# Patient Record
Sex: FEMALE | URBAN - METROPOLITAN AREA
[De-identification: names, ages, dates, MRNs, and addresses within clinical notes are randomized per-mention and may not be internally consistent; named-entity substitution may affect disease eponyms.]

---

## 2024-11-21 ENCOUNTER — APPOINTMENT (RX ONLY)
Dept: URBAN - METROPOLITAN AREA CLINIC 74 | Facility: CLINIC | Age: 24
Setting detail: DERMATOLOGY
End: 2024-11-21

## 2024-11-21 DIAGNOSIS — L259 CONTACT DERMATITIS AND OTHER ECZEMA, UNSPECIFIED CAUSE: ICD-10-CM

## 2024-11-21 PROBLEM — L30.9 DERMATITIS, UNSPECIFIED: Status: ACTIVE | Noted: 2024-11-21

## 2024-11-21 PROCEDURE — 99203 OFFICE O/P NEW LOW 30 MIN: CPT

## 2024-11-21 PROCEDURE — ? COUNSELING

## 2024-11-21 PROCEDURE — ? PRESCRIPTION

## 2024-11-21 PROCEDURE — ? TREATMENT REGIMEN

## 2024-11-21 PROCEDURE — ? PATIENT SPECIFIC COUNSELING

## 2024-11-21 RX ORDER — TACROLIMUS 1 MG/G
OINTMENT TOPICAL
Qty: 60 | Refills: 1 | Status: ERX | COMMUNITY
Start: 2024-11-21

## 2024-11-21 RX ADMIN — TACROLIMUS: 1 OINTMENT TOPICAL at 00:00

## 2024-11-21 ASSESSMENT — LOCATION SIMPLE DESCRIPTION DERM
LOCATION SIMPLE: RIGHT HAND
LOCATION SIMPLE: LEFT HAND

## 2024-11-21 ASSESSMENT — LOCATION ZONE DERM: LOCATION ZONE: HAND

## 2024-11-21 ASSESSMENT — LOCATION DETAILED DESCRIPTION DERM
LOCATION DETAILED: RIGHT ULNAR DORSAL HAND
LOCATION DETAILED: LEFT RADIAL DORSAL HAND

## 2024-11-21 NOTE — PROCEDURE: PATIENT SPECIFIC COUNSELING
Detail Level: Detailed
Hand dermatitis not present today; pixx reviewed; Pt reports it is worse in heat and humidity\\nPt reports it flares when using laundry detergent,  pods, and neosporin \\nPt reports it flares about once a week\\nPt has used clobetasol ointment in the past, reports it helped with flares (got from [place])\\nPt has tried lipikar, reports it helped with itching\\nPt concerned about steroid over-use\\nDisc r/b/sed tacrolimus to use after clobetasol *7d prn for flares\\nRec fragrance free products, instructed pt to bring own products to work\\nRec applying vaseline instead of neosporin for cuts and no adhesive covering\\nPt instructed to send photo if it flares\\n-consider bx +/- patch test prn flares next visit if still present

## 2024-11-21 NOTE — PROCEDURE: TREATMENT REGIMEN
Detail Level: Zone
Initiate Treatment: tacrolimus 0.1 % topical ointment \\nSig: Apply a thin layer to affected areas on hands bid

## 2024-11-21 NOTE — HPI: RASH (PATIENT REPORTED)
Where Is Your Rash Located?: Hands
List Prescription Topical Steroids You Tried (Separate Each Name With A Comma):: Clobetasol